# Patient Record
Sex: FEMALE | Race: OTHER | NOT HISPANIC OR LATINO | URBAN - METROPOLITAN AREA
[De-identification: names, ages, dates, MRNs, and addresses within clinical notes are randomized per-mention and may not be internally consistent; named-entity substitution may affect disease eponyms.]

---

## 2021-09-10 ENCOUNTER — EMERGENCY (EMERGENCY)
Facility: HOSPITAL | Age: 41
LOS: 1 days | Discharge: HOME | End: 2021-09-10
Attending: EMERGENCY MEDICINE | Admitting: EMERGENCY MEDICINE
Payer: SELF-PAY

## 2021-09-10 VITALS
TEMPERATURE: 99 F | SYSTOLIC BLOOD PRESSURE: 127 MMHG | RESPIRATION RATE: 16 BRPM | OXYGEN SATURATION: 99 % | HEART RATE: 80 BPM | DIASTOLIC BLOOD PRESSURE: 82 MMHG | WEIGHT: 123.46 LBS

## 2021-09-10 DIAGNOSIS — Z77.21 CONTACT WITH AND (SUSPECTED) EXPOSURE TO POTENTIALLY HAZARDOUS BODY FLUIDS: ICD-10-CM

## 2021-09-10 DIAGNOSIS — Z59.0 HOMELESSNESS: ICD-10-CM

## 2021-09-10 DIAGNOSIS — Z86.39 PERSONAL HISTORY OF OTHER ENDOCRINE, NUTRITIONAL AND METABOLIC DISEASE: ICD-10-CM

## 2021-09-10 DIAGNOSIS — Z23 ENCOUNTER FOR IMMUNIZATION: ICD-10-CM

## 2021-09-10 PROCEDURE — 99284 EMERGENCY DEPT VISIT MOD MDM: CPT

## 2021-09-10 RX ORDER — HEPATITIS B IMMUNE GLOBULIN (HUMAN) 1560 [IU]/5ML
3.36 LIQUID INTRAMUSCULAR ONCE
Refills: 0 | Status: COMPLETED | OUTPATIENT
Start: 2021-09-10 | End: 2021-09-10

## 2021-09-10 RX ORDER — HEPATITIS B VIRUS VACCINE,RECB 20 MCG/ML
20 VIAL (ML) INTRAMUSCULAR ONCE
Refills: 0 | Status: COMPLETED | OUTPATIENT
Start: 2021-09-10 | End: 2021-09-10

## 2021-09-10 RX ADMIN — HEPATITIS B IMMUNE GLOBULIN (HUMAN) 3.36 MILLILITER(S): 1560 LIQUID INTRAMUSCULAR at 15:45

## 2021-09-10 RX ADMIN — Medication 20 MICROGRAM(S): at 15:58

## 2021-09-10 SDOH — ECONOMIC STABILITY - HOUSING INSECURITY: HOMELESSNESS: Z59.0
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
## 2021-09-10 NOTE — ED PROVIDER NOTE - PATIENT PORTAL LINK FT
You can access the FollowMyHealth Patient Portal offered by [facility] by registering at the following website: [email or web address]. By joining [employer]’s FollowMyHealth portal, you will also be able to view your health information using other applications (apps) compatible with our system.

## 2021-09-10 NOTE — ED PROVIDER NOTE - NSFOLLOWUPINSTRUCTIONS_ED_ALL_ED_FT
Educación para el paciente: Exposición a la arturo o los fluidos corporales (Conceptos Básicos)  View in   language     Redactado por los médicos y editores de [employer]  ¿Qué kentrell saber acerca de la exposición a la arturo o los fluidos corporales?  La arturo y otros fluidos corporales (quyen la saliva o la orina) transportan virus y bacterias que pueden infectarlo si se expone a ellos. Con mayor frecuencia, padmini es un problema para las personas que trabajan en hospitales u otros entornos de atención de la parveen. Las infecciones más graves a las que debe prestar atención son el VIH y las hepatitis B y C.    ¿Cómo puedo saber si estuve “expuesto” de beto manera en la que podría infectarme?  El contacto con arturo, tejido u otros fluidos corporales de beto persona infectada puede ser riesgoso si involucra:    ?Beto aguja o un objeto filoso que le atraviesa la piel    ?Contacto con tejidos rosados y húmedos llamados “membranas mucosas” que bordean la boca, la nariz, los ojos y otras partes del cuerpo    ?Contacto con partes de la piel que tengan quintanilla o raspones que aún sangran    ?Contacto con formas concentradas de un virus, quyen las que se pueden encontrar en un laboratorio de investigación    No todos los fluidos corporales son igualmente peligrosos. En general, la arturo tiene más virus que otros fluidos corporales.    ¿Qué sucede si la arturo u otros fluidos entran en contacto con mi piel?  La piel saludable lo protege muy brooke de beto infección. Aun así, si la piel entra en contacto con arturo o fluidos corporales, debe cristiano con agua y jabón la farrukh que estuvo expuesta. Si tiene algún sb o raspón en la piel, también debe usar alcohol u otro desinfectante sobre esas zonas de la piel después de haberse lavado.    ¿Qué sucede si la arturo u otros fluidos entran en contacto con mi boca, nariz u ojos?  Si cosby boca o nariz entra en contacto con arturo o fluidos corporales, debe enjuagarse con mucha agua. Si khari ojos entran en contacto con arturo o fluidos corporales, debe enjuagarlos con beto solución de agua salada llamada "solución salina".    ¿Qué sucede si me pinché con beto aguja?  Si la exposición involucra algo que atravesó la piel (quyen beto aguja) debe lavarse con agua y jabón y usar un desinfectante, bernie no debe estrujar ni presionar la farrukh para intentar sacar nada. Lyden es igual en el ambrose de un pinchazo con beto aguja o si se corta con un instrumento quirúrgico.    ¿Con quién kentrell hablar?  Siempre que se exponga a arturo o fluidos corporales en el trabajo, debe hablar con cosby jefe o alguien del “departamento de parveen ocupacional” de recursos humanos de cosby empleo. Es posible que deba completar un informe y bobby a un médico.    Si no puede hablar con nadie de cosby empresa de inmediato, o si esto sucedió fuera del lugar de trabajo, llame a cosby médico cuanto antes. Si estuvo expuesto, es muy importante que consulte al médico lo antes posible.    ¿Qué hará el médico?  El médico le hará beto prueba de arturo para bobby si ya tiene alguna infección de la que no estaba al tanto y averiguará la mayor cantidad de detalles posible acerca del tipo de exposición que usted tuvo. Por ejemplo, si se pinchó con beto aguja, el médico le preguntará:    ?Si recuerda el tamaño de la aguja    ?Si sangró después del pinchazo    ?Si alexsandra arturo en la aguja o jeringa antes de pincharse    El médico también tratará de identificar al paciente a quien pertenece la arturo o los fluidos a los que usted estuvo expuesto. De rohan manera, el médico puede determinar si el paciente tiene alguna infección registrada, quyen el VIH. Si el paciente nunca se hizo beto prueba de VIH o hepatitis, se le pregunta si está dispuesto a hacerse las pruebas. Las pruebas que se le realicen al paciente pueden ayudar a que el médico determine los tratamientos que usted podría necesitar. Por ejemplo, si la prueba muestra que el paciente no tiene VIH, entonces no necesita ningún tratamiento para el VIH.    El médico también le explicará cuáles son las probabilidades de que se infecte según el tipo de exposición que haya tenido. Por ejemplo, pincharse con beto aguja que se usó en beto persona con hepatitis C es mucho más riesgoso que salpicarse la piel con orina de la misma persona.    ¿Qué sucede si estoy embarazada?  Si está embarazada o existe alguna probabilidad de que lo esté, asegúrese de informárselo al médico. Si está embarazada, es posible que cosby exposición deba tratarse de forma diferente de si no lo estuviera.    ¿Qué sucede si podría haberme expuesto al VIH?  Si está completamente seguro de que estuvo expuesto al VIH, o existe la posibilidad de que lo haya estado, el médico puede darle la opción de coleman medicinas para disminuir las probabilidades de contraer el VIH. Son las mismas medicinas que se utilizan para tratar el VIH. Si decide coleman estas medicinas, debe comenzar a tomarlas lo antes posible y terry al menos un mes. Es probable que deba coleman 2 a 3 píldoras diferentes. Consulte a cosby médico acerca de los riesgos y beneficios de estas medicinas.    ¿Qué sucede si podría haberme expuesto a la hepatitis B?  Si está completamente seguro de que estuvo expuesto a la hepatitis B, o existe la posibilidad de que lo haya estado, el médico querrá saber si usted recibió la vacuna contra la hepatitis B. El tratamiento que necesita depende de si ya recibió la vacuna y de la efectividad de la vacuna en cosby cuerpo. Si ya le seymour hecho beto prueba y cosby médico sabe que la vacuna fue efectiva para usted, no es necesario hacer nada más.    ?Si no sabe qué roldan efectiva fue la vacuna para usted, el médico podría hacerle beto prueba de arturo para determinar cómo reaccionó cosby cuerpo. A veces es posible que el médico decida darle otra dosis de la vacuna mientras esperan los resultados de la prueba de arturo. Sin embargo, si con las pruebas de arturo se observa que usted está protegido, no necesita ningún otro tratamiento para prevenir la hepatitis B.    ?Si se vacunó anteriormente contra la hepatitis B bernie no está brooke protegido, debe vacunarse de nuevo. En algunos casos, los niveles de protección pueden bajar, bernie un simple refuerzo de la vacuna puede elevar nuevamente la protección. Es posible que también necesite beto inyección de beto medicina llamada "inmunoglobulina contra la hepatitis B". Esta medicina puede ayudar a prevenir beto infección de hepatitis B.    ?Si nunca antes se vacunó contra la hepatitis B, es un buen momento para hacerlo. Aún puede protegerlo de la infección, incluso después de riya estado expuesto. Es posible que también necesite beto medicina llamada "inmunoglobulina contra la hepatitis B". Esta medicina lo mantendrá protegido hasta que la vacuna comience a actuar. Además, debe recibir todas las vacunas que pudieran faltarle, quyen la vacuna contra el tétano; de esta manera, quedará completamente protegido para el futuro.    ¿Qué sucede si podría haberme expuesto a la hepatitis C?
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
